# Patient Record
Sex: MALE | Race: BLACK OR AFRICAN AMERICAN | NOT HISPANIC OR LATINO | Employment: STUDENT | URBAN - METROPOLITAN AREA
[De-identification: names, ages, dates, MRNs, and addresses within clinical notes are randomized per-mention and may not be internally consistent; named-entity substitution may affect disease eponyms.]

---
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
## 2024-02-22 ENCOUNTER — OFFICE VISIT (OUTPATIENT)
Dept: URGENT CARE | Facility: CLINIC | Age: 19
End: 2024-02-22
Payer: MEDICAID

## 2024-02-22 VITALS
RESPIRATION RATE: 18 BRPM | OXYGEN SATURATION: 98 % | BODY MASS INDEX: 37.23 KG/M2 | DIASTOLIC BLOOD PRESSURE: 76 MMHG | WEIGHT: 231.69 LBS | HEART RATE: 73 BPM | SYSTOLIC BLOOD PRESSURE: 145 MMHG | HEIGHT: 66 IN | TEMPERATURE: 98 F

## 2024-02-22 DIAGNOSIS — L05.01 PILONIDAL ABSCESS: Primary | ICD-10-CM

## 2024-02-22 PROCEDURE — 99204 OFFICE O/P NEW MOD 45 MIN: CPT | Mod: S$GLB,,, | Performed by: EMERGENCY MEDICINE

## 2024-02-22 RX ORDER — SULFAMETHOXAZOLE AND TRIMETHOPRIM 800; 160 MG/1; MG/1
1 TABLET ORAL 2 TIMES DAILY
Qty: 14 TABLET | Refills: 0 | Status: SHIPPED | OUTPATIENT
Start: 2024-02-22 | End: 2024-02-29

## 2024-02-22 RX ORDER — IBUPROFEN 800 MG/1
800 TABLET ORAL 3 TIMES DAILY
Qty: 21 TABLET | Refills: 0 | Status: SHIPPED | OUTPATIENT
Start: 2024-02-22 | End: 2024-02-29

## 2024-02-22 NOTE — LETTER
February 22, 2024      [facility]  [address] [facility], [address]  [place] [address]  Phone: [phone]  Fax: [phone]       Patient: Tristen Davey   YOB: 2005  Date of Visit: 02/22/2024    To Whom It May Concern:    Lindsay Davey  was at [facility] on 02/22/2024. The patient may return to work/school on 2/24/2024 with no restrictions. If you have any questions or concerns, or if I can be of further assistance, please do not hesitate to contact me.    Sincerely,      Celestine Walsh MD

## 2024-02-22 NOTE — PATIENT INSTRUCTIONS
Referral made to General surgery as this is the 3rd incident in 3 years however incision and drainage may be effective.  If it recurs again please follow up with General surgery     Bactrim double-strength prescription     Ibuprofen 800 mg prescription     Return for any concerns or problems and be sure to return in 48 hours for packing removal and wound check     Work note given to return on Saturday     See abscess incision and drainage sheet

## 2024-02-22 NOTE — PROGRESS NOTES
"Subjective:      Patient ID: Tristen VALENTINO Davey is a 18 y.o. male.    Vitals:  height is 5' 6" (1.676 m) and weight is 105.1 kg (231 lb 11.3 oz). His oral temperature is 98.4 °F (36.9 °C). His blood pressure is 145/76 (abnormal) and his pulse is 73. His respiration is 18 and oxygen saturation is 98%.     Chief Complaint: Abscess    Pt is here for a cyst on his tail bone. Pt mentioned this is the third time it has busted and come back. Pt mentioned he was taking antibiotics for it,pt is no longer on the medicine. Pt say it started to grow back three days ago.      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  It has happened 3 times in the last 3 years and states that it gets better with antibiotics however comes back.  States it occasionally drains on its own.  He states that it has never been officially incised and drained in clinic and he has not followed up with General surgery.  Physical exam shows a large area of induration about 5 x 5 cm with 2 cm area of fluctuance.  I was able to drain significant amount of pus and packed with iodophor.  Will place on antibiotics, ibuprofen 800 and due to recurrence, referral to general surgery for definitive management and prevention of reoccurrence.  It may simply go away since this is the 1st procedural treatment however referral will be in place.  Note to return on Saturday he will return in 48 hours for packing removal and wound check    Abscess  Chronicity:  NewProgression Since Onset: spreading  Abscess location: tailbone.  Associated Symptoms: no fever, no chills  Characteristics: painful    Pain Scale:  8/10  Treatments Tried:  Warm water soaks  Relieved by:  Nothing  Worsened by:  Nothing      Constitution: Negative for chills and fever.   HENT:  Negative for postnasal drip, sinus pain and sore throat.    Neck: Negative for neck pain and neck stiffness.   Cardiovascular:  " Negative for chest pain and palpitations.   Respiratory:  Negative for cough and shortness of breath.    Genitourinary:  Negative for dysuria and hematuria.   Musculoskeletal:  Negative for joint pain.   Skin:  Positive for abscess. Negative for wound, laceration and erythema.   Neurological:  Negative for altered mental status, numbness and tingling.   Psychiatric/Behavioral:  Negative for altered mental status.       Objective:     Physical Exam   Constitutional: He is oriented to person, place, and time. He appears well-developed.   HENT:   Head: Normocephalic and atraumatic. Head is without abrasion, without contusion and without laceration.   Ears:   Right Ear: External ear normal.   Left Ear: External ear normal.   Nose: Nose normal.   Mouth/Throat: Oropharynx is clear and moist and mucous membranes are normal.   Eyes: Conjunctivae, EOM and lids are normal. Pupils are equal, round, and reactive to light.   Neck: Trachea normal and phonation normal. Neck supple.   Cardiovascular: Normal rate, regular rhythm and normal heart sounds.   Pulmonary/Chest: Effort normal and breath sounds normal. No stridor. No respiratory distress.   Musculoskeletal: Normal range of motion.         General: Normal range of motion.   Neurological: He is alert and oriented to person, place, and time.   Skin: Skin is warm, dry, intact and no rash. Capillary refill takes less than 2 seconds. No abrasion, No burn, No bruising, No erythema and No ecchymosis         Comments: Examination of the left upper gluteal cleft and pilonidal region shows a 5 x 5 cm area of induration and 2 x 2 cm area of fluctuance consistent with pilonidal or upper medial gluteal cleft abscess.  Incision and drainage performed see procedure note.  Packed with iodophor.  Covered.  No complications.  It is not perianal and there is no rash or extending cellulitis.   Psychiatric: His speech is normal and behavior is normal. Judgment and thought content normal.  "  Nursing note and vitals reviewed.        Incision & Drainage    Date/Time: 2/22/2024 8:45 AM    Performed by: Celestine Walsh MD  Authorized by: Celestine Walsh MD    Time out: Immediately prior to procedure a "time out" was called to verify the correct patient, procedure, equipment, support staff and site/side marked as required.    Consent Done?:  Yes (Verbal)    Type:  Abscess  Body area:  Anogenital  Location details:  Pilonidal  Local anesthetic: Lidocaine 1% without epinephrine  Anesthetic total (ml):  8  Scalpel size:  11  Incision type:  Single straight  Complexity:  Complex  Drainage:  Pus  Drainage amount:  Moderate  Wound treatment:  Incision, drainage and wound packed  Packing material:  1/4 in iodoform gauze  Patient tolerance:  Patient tolerated the procedure well with no immediate complications    COVERED WITH BACTROBAN OINTMENT  COVERED WITH GAUZE  NO COMPLICATIONS  WILL HAVE PACKING OUT IN 48 HOURS  GIVEN INSTRUCTIONS OF WHEN TO RETURN        Assessment:     1. Pilonidal abscess        Plan:       Pilonidal abscess  -     Incision & Drainage    Other orders  -     sulfamethoxazole-trimethoprim 800-160mg (BACTRIM DS) 800-160 mg Tab; Take 1 tablet by mouth 2 (two) times daily. for 7 days  Dispense: 14 tablet; Refill: 0  -     ibuprofen (ADVIL,MOTRIN) 800 MG tablet; Take 1 tablet (800 mg total) by mouth 3 (three) times daily. for 7 days  Dispense: 21 tablet; Refill: 0      Patient Instructions   Referral made to General surgery as this is the 3rd incident in 3 years however incision and drainage may be effective.  If it recurs again please follow up with General surgery     Bactrim double-strength prescription     Ibuprofen 800 mg prescription     Return for any concerns or problems and be sure to return in 48 hours for packing removal and wound check     Work note given to return on Saturday     See abscess incision and drainage sheet                  "

## 2024-02-24 ENCOUNTER — CLINICAL SUPPORT (OUTPATIENT)
Dept: URGENT CARE | Facility: CLINIC | Age: 19
End: 2024-02-24
Payer: MEDICAID

## 2024-02-24 VITALS
HEART RATE: 73 BPM | WEIGHT: 231 LBS | BODY MASS INDEX: 37.12 KG/M2 | HEIGHT: 66 IN | DIASTOLIC BLOOD PRESSURE: 79 MMHG | OXYGEN SATURATION: 98 % | TEMPERATURE: 98 F | SYSTOLIC BLOOD PRESSURE: 148 MMHG | RESPIRATION RATE: 18 BRPM

## 2024-02-24 DIAGNOSIS — L05.01 PILONIDAL ABSCESS: Primary | ICD-10-CM

## 2024-02-24 PROCEDURE — 99499 UNLISTED E&M SERVICE: CPT | Mod: S$GLB,,,

## 2024-02-24 NOTE — PROGRESS NOTES
"Subjective:      Patient ID: Tristen VALENTINO Davey is a 18 y.o. male.    Vitals:  height is 5' 6" (1.676 m) and weight is 104.8 kg (231 lb). His tympanic temperature is 98 °F (36.7 °C). His blood pressure is 148/79 (abnormal) and his pulse is 73. His respiration is 18 and oxygen saturation is 98%.     Chief Complaint: Dressing Change    Pt is a 17 y/o M here for wound check. Pilonidal abscess drained on 2/22/24 and packing placed. Currently on bactrim. Symptoms improving. Mild discomfort to area. No new symptoms such as fever or chills.    Dressing Change  This is a new problem. The current episode started in the past 7 days. The problem occurs constantly. The problem has been unchanged. Pertinent negatives include no abdominal pain, chills, fever, nausea or vomiting. Treatments tried: prescription meds. The treatment provided moderate relief.       Constitution: Negative for chills and fever.   Gastrointestinal:  Negative for abdominal pain, nausea and vomiting.   Genitourinary:  Negative for testicular pain and pelvic pain.   Skin:  Positive for wound and abscess.      Objective:     Physical Exam   Constitutional: He is oriented to person, place, and time. He appears well-developed.   HENT:   Head: Normocephalic and atraumatic.   Ears:   Right Ear: External ear normal.   Left Ear: External ear normal.   Nose: Nose normal.   Mouth/Throat: Oropharynx is clear and moist.   Eyes: Conjunctivae, EOM and lids are normal.   Neck: Trachea normal and phonation normal. Neck supple.   Musculoskeletal: Normal range of motion.         General: Normal range of motion.   Neurological: He is alert and oriented to person, place, and time.   Skin: Skin is warm, dry and intact.        Psychiatric: His speech is normal and behavior is normal. Judgment and thought content normal.   Nursing note and vitals reviewed.      Assessment:     1. Pilonidal abscess        Plan:       Pilonidal abscess      Packing removed today. Scant purulent " drainage. Area of infection as decreased compared to visit on 2/22/24. Continue bactrim. F/up with gen surg.          Patient Instructions   Continue full course of bactrim.    Follow up with general surgery

## 2024-03-05 ENCOUNTER — OCCUPATIONAL HEALTH (OUTPATIENT)
Dept: URGENT CARE | Facility: CLINIC | Age: 19
End: 2024-03-05

## 2024-03-05 DIAGNOSIS — Z02.89 ENCOUNTER FOR EXAMINATION REQUIRED BY DEPARTMENT OF TRANSPORTATION (DOT): Primary | ICD-10-CM

## 2024-03-05 PROCEDURE — 99499 UNLISTED E&M SERVICE: CPT | Mod: S$GLB,,, | Performed by: EMERGENCY MEDICINE

## 2024-04-15 ENCOUNTER — PATIENT MESSAGE (OUTPATIENT)
Dept: PEDIATRICS | Facility: CLINIC | Age: 19
End: 2024-04-15
Payer: MEDICAID

## 2024-05-21 ENCOUNTER — HOSPITAL ENCOUNTER (EMERGENCY)
Facility: HOSPITAL | Age: 19
Discharge: HOME | End: 2024-05-21
Attending: EMERGENCY MEDICINE

## 2024-05-21 VITALS
SYSTOLIC BLOOD PRESSURE: 158 MMHG | HEART RATE: 79 BPM | HEIGHT: 66 IN | DIASTOLIC BLOOD PRESSURE: 82 MMHG | WEIGHT: 225 LBS | OXYGEN SATURATION: 98 % | TEMPERATURE: 100 F | BODY MASS INDEX: 36.16 KG/M2 | RESPIRATION RATE: 18 BRPM

## 2024-05-21 DIAGNOSIS — U07.1 COVID-19 VIRUS DETECTED: ICD-10-CM

## 2024-05-21 DIAGNOSIS — U07.1 COVID-19: Primary | ICD-10-CM

## 2024-05-21 LAB
CTP QC/QA: YES
INFLUENZA A ANTIGEN, POC: NEGATIVE
INFLUENZA B ANTIGEN, POC: NEGATIVE
POC RAPID STREP A: NEGATIVE
SARS-COV-2 RDRP RESP QL NAA+PROBE: POSITIVE

## 2024-05-21 PROCEDURE — 87880 STREP A ASSAY W/OPTIC: CPT | Mod: ER

## 2024-05-21 PROCEDURE — 99283 EMERGENCY DEPT VISIT LOW MDM: CPT | Mod: ER

## 2024-05-21 PROCEDURE — 25000003 PHARM REV CODE 250: Mod: ER | Performed by: EMERGENCY MEDICINE

## 2024-05-21 PROCEDURE — 87635 SARS-COV-2 COVID-19 AMP PRB: CPT | Mod: ER

## 2024-05-21 PROCEDURE — 87804 INFLUENZA ASSAY W/OPTIC: CPT | Mod: ER

## 2024-05-21 RX ORDER — IBUPROFEN 600 MG/1
600 TABLET ORAL
Status: COMPLETED | OUTPATIENT
Start: 2024-05-21 | End: 2024-05-21

## 2024-05-21 RX ORDER — FLUTICASONE PROPIONATE 50 MCG
1 SPRAY, SUSPENSION (ML) NASAL 2 TIMES DAILY PRN
Qty: 15 G | Refills: 0 | Status: SHIPPED | OUTPATIENT
Start: 2024-05-21

## 2024-05-21 RX ORDER — IBUPROFEN 600 MG/1
600 TABLET ORAL EVERY 6 HOURS PRN
Qty: 20 TABLET | Refills: 0 | Status: SHIPPED | OUTPATIENT
Start: 2024-05-21

## 2024-05-21 RX ORDER — BENZONATATE 100 MG/1
100 CAPSULE ORAL 3 TIMES DAILY PRN
Qty: 20 CAPSULE | Refills: 0 | Status: SHIPPED | OUTPATIENT
Start: 2024-05-21 | End: 2024-05-31

## 2024-05-21 RX ORDER — IBUPROFEN 400 MG/1
400 TABLET ORAL
Status: DISCONTINUED | OUTPATIENT
Start: 2024-05-21 | End: 2024-05-21

## 2024-05-21 RX ORDER — GUAIFENESIN 100 MG/5ML
100-200 SOLUTION ORAL EVERY 4 HOURS PRN
Qty: 60 ML | Refills: 0 | Status: SHIPPED | OUTPATIENT
Start: 2024-05-21 | End: 2024-05-31

## 2024-05-21 RX ORDER — ACETAMINOPHEN 500 MG
500 TABLET ORAL EVERY 6 HOURS PRN
Qty: 20 TABLET | Refills: 0 | Status: SHIPPED | OUTPATIENT
Start: 2024-05-21

## 2024-05-21 RX ORDER — CETIRIZINE HYDROCHLORIDE 10 MG/1
10 TABLET ORAL DAILY
Qty: 30 TABLET | Refills: 0 | Status: SHIPPED | OUTPATIENT
Start: 2024-05-21 | End: 2024-06-20

## 2024-05-21 RX ADMIN — IBUPROFEN 600 MG: 600 TABLET ORAL at 05:05

## 2024-05-21 NOTE — ED PROVIDER NOTES
Encounter Date: 5/21/2024       History     Chief Complaint   Patient presents with    Sore Throat     Sore throat, bodyaches, stomach upset, and chills since Sunday.      Patient is a 18 y.o. male with no past medical history who presents to the Emergency Department for evaluation of URI symptoms x 2 days.  Symptoms include fever, chills, cough, congestion body aches, and sore throat.  He has not taken any medications for the symptoms. He is vaccinated for COVID but not the flu. Denies known sick contacts.  He denies headache, chest pain, shortness of breath, abdominal pain. Denies nausea, vomiting, diarrhea, difficulty swallowing, or otalgia.     The history is provided by the patient.     Review of patient's allergies indicates:  No Known Allergies  No past medical history on file.  No past surgical history on file.  No family history on file.  Social History     Tobacco Use    Smoking status: Never    Smokeless tobacco: Never   Substance Use Topics    Alcohol use: No    Drug use: No     Review of Systems   Constitutional:  Positive for chills. Negative for fever.   HENT:  Positive for congestion and sore throat. Negative for ear pain and trouble swallowing.    Respiratory:  Positive for cough. Negative for shortness of breath.    Cardiovascular:  Negative for chest pain.   Gastrointestinal:  Negative for abdominal pain, nausea and vomiting.   Musculoskeletal:  Positive for myalgias. Negative for neck pain and neck stiffness.   Neurological:  Negative for headaches.       Physical Exam     Initial Vitals [05/21/24 1712]   BP Pulse Resp Temp SpO2   (!) 158/82 79 18 99.7 °F (37.6 °C) 98 %      MAP       --         Physical Exam    Nursing note and vitals reviewed.  Constitutional: He appears well-developed and well-nourished.   HENT:   Head: Normocephalic and atraumatic.   Right Ear: External ear normal.   Left Ear: External ear normal.   There is posterior oropharyngeal erythema with postnasal drip and  cobblestoning in the oropharynx, no tonsillar swelling, no oropharyngeal exudates, uvula is midline. Normal dentition. No trismus.  No muffled voice. No submandibular swelling. Patient is tolerating secretions without difficulty.  Patient is speaking in full sentences on exam without difficulty.  Bilateral tympanic membranes are pearly gray without erythema, bulging, perforation.  There is no postauricular swelling, or overlying erythema or tenderness to palpation over mastoids bilaterally.    Neck: Carotid bruit is not present.   Normal range of motion.  Cardiovascular:  Normal rate, regular rhythm, normal heart sounds and intact distal pulses.     Exam reveals no gallop and no friction rub.       No murmur heard.  Pulmonary/Chest: Breath sounds normal. No respiratory distress. He has no wheezes. He has no rhonchi. He has no rales.   Abdominal: Abdomen is soft. Bowel sounds are normal. He exhibits no distension. There is no abdominal tenderness. There is no rebound and no guarding.   Musculoskeletal:         General: Normal range of motion.      Cervical back: Normal range of motion.     Neurological: He is alert and oriented to person, place, and time. GCS score is 15. GCS eye subscore is 4. GCS verbal subscore is 5. GCS motor subscore is 6.   Psychiatric: He has a normal mood and affect.         ED Course   Procedures  Labs Reviewed   SARS-COV-2 RDRP GENE - Abnormal; Notable for the following components:       Result Value    POC Rapid COVID Positive (*)     All other components within normal limits    Narrative:     This test utilizes isothermal nucleic acid amplification technology to detect the SARS-CoV-2 RdRp nucleic acid segment. The analytical sensitivity (limit of detection) is 500 copies/swab.     A POSITIVE result is indicative of the presence of SARS-CoV-2 RNA; clinical correlation with patient history and other diagnostic information is necessary to determine patient infection status.    A NEGATIVE  result means that SARS-CoV-2 nucleic acids are not present above the limit of detection. A NEGATIVE result should be treated as presumptive. It does not rule out the possibility of COVID-19 and should not be the sole basis for treatment decisions. If COVID-19 is strongly suspected based on clinical and exposure history, re-testing using an alternate molecular assay should be considered.     Commercial kits are provided by [employer].   _________________________________________________________________   The authorized Fact Sheet for Healthcare Providers and the authorized Fact Sheet for Patients of the ID NOW COVID-19 are available on the FDA website:    https://www.fda.gov/media/858197/download      https://www.fda.gov/media/990839/download      POCT STREP A MOLECULAR   POCT INFLUENZA A/B MOLECULAR   POCT STREP A, RAPID   POCT RAPID INFLUENZA A/B          Imaging Results    None          Medications   ibuprofen tablet 600 mg (600 mg Oral Given 5/21/24 1722)     Medical Decision Making  This is an emergent evaluation of a  18 y.o. male with no past medical history who presents to the Emergency Department for evaluation of URI symptoms x 2 days.      Patient looks well clinically. There is posterior oropharyngeal erythema with postnasal drip and cobblestoning in the oropharynx, no tonsillar swelling, no oropharyngeal exudates, uvula is midline. Normal dentition. No trismus.  No muffled voice. No submandibular swelling. Patient is tolerating secretions without difficulty.  Patient is speaking in full sentences on exam without difficulty.  Bilateral tympanic membranes are pearly gray without erythema, bulging, perforation.  There is no postauricular swelling, or overlying erythema or tenderness to palpation over mastoids bilaterally.  Regular rate rhythm without murmurs.  No carotid bruits appreciated on exam. Lungs are clear to auscultation bilaterally.  Abdomen is soft, nontender, non distended, with normal  bowel sounds.     Differential diagnosis includes but is not limited to COVID, flu, strep, other viral syndrome.    Workup initiated with viral swabs. Ordered motrin.  Vital signs, chart, labs, and/or imaging were all reviewed.  See ED course below and interpretations above. My overall impression is COVID. Will discharge home with symptomatic medications as listed below.  CDC precautions discussed. Patient is very well appearing, and in no acute distress. Vital signs are reassuring here in the emergency department, patient is afebrile, breathing comfortable, satting 98 % on room air. Patient/Caregiver is stable for discharge at this time.  Patient/Caregiver was informed of results and plan of care. Patient/Caregiver verbalized understanding of care plan. All questions and concerns were addressed. Discussed strict return precautions with the patient/caregiver. Instructed follow up with primary care provider within 1 week.      Romaine Obrien PA-C    DISCLAIMER: This note was prepared with [employer] voice recognition transcription software. Garbled syntax, mangled pronouns, and other bizarre constructions may be attributed to that software system.      Amount and/or Complexity of Data Reviewed  Labs: ordered. Decision-making details documented in ED Course.    Risk  OTC drugs.  Prescription drug management.               ED Course as of 05/21/24 1745   Tue May 21, 2024   1742 POCT COVID-19 Rapid Screening(!)  COVID positive. [TM]   1742 POCT Rapid Strep A  Strep negative. [TM]   1743 POCT Rapid Influenza A/B  Flu negative. [TM]   1743 Informed patient of results.  Will discharge home with symptomatic care.  Work note provided. [TM]      ED Course User Index  [TM] Romaine Obrien PA-C                           Clinical Impression:  Final diagnoses:  [U07.1] COVID-19 (Primary)          ED Disposition Condition    Discharge Stable          ED Prescriptions       Medication Sig Dispense Start Date End Date Auth. Provider     benzonatate (TESSALON) 100 MG capsule Take 1 capsule (100 mg total) by mouth 3 (three) times daily as needed. 20 capsule 5/21/2024 5/31/2024 Romaine Obrien PA-C    guaiFENesin 100 mg/5 ml (ROBITUSSIN) 100 mg/5 mL syrup Take 5-10 mLs (100-200 mg total) by mouth every 4 (four) hours as needed for Cough. 60 mL 5/21/2024 5/31/2024 Romaine Obrien PA-C    acetaminophen (TYLENOL) 500 MG tablet Take 1 tablet (500 mg total) by mouth every 6 (six) hours as needed. 20 tablet 5/21/2024 -- Romaine Obrien PA-C    ibuprofen (ADVIL,MOTRIN) 600 MG tablet Take 1 tablet (600 mg total) by mouth every 6 (six) hours as needed for Pain. 20 tablet 5/21/2024 -- Romaine Obrien PA-C    cetirizine (ZYRTEC) 10 MG tablet Take 1 tablet (10 mg total) by mouth once daily. 30 tablet 5/21/2024 6/20/2024 Romaine Obrien PA-C    fluticasone propionate (FLONASE) 50 mcg/actuation nasal spray 1 spray (50 mcg total) by Each Nostril route 2 (two) times daily as needed for Rhinitis. 15 g 5/21/2024 -- Romaine Obrien PA-C    benzocaine-menthoL 15-3.6 mg Lozg Follow directions on packaging 18 lozenge 5/21/2024 -- Romaine Obrien PA-C          Follow-up Information       Follow up With Specialties Details Why Contact Info    [facility] ED Emergency Medicine Go to  As needed, If symptoms worsen, or new symptoms develop [id number] [facility] [id number] [phone]    Primary care doctor  Schedule an appointment as soon as possible for a visit in 3 days               Romaine Obrien PA-C  05/21/24 [id number]

## 2024-05-21 NOTE — DISCHARGE INSTRUCTIONS
You were seen in the emergency department today for viral symptoms and were diagnosed with COVID-19.  Take all medications as prescribed for symptoms.  Per CDC guidelines, once you were fever free and feeling improved you may return to normal activity.  No isolation required if feeling better.  It is important to remember that some problems are difficult to diagnose and may not be found during your Emergency Department visit. Be sure to follow up with your primary care doctor and review all labs/imaging/tests that were performed during this visit with them. Some labs/tests may be outside of the normal range and require non-emergent follow-up and further investigation to help diagnose/exclude/prevent complications or other medical conditions. Return to the emergency department for any new or worsening symptoms. Thank you for allowing me to care for you today, it was my pleasure. I hope you get to feeling better soon!

## 2024-05-21 NOTE — Clinical Note
"Tristen "Tristen" Petar was seen and treated in our emergency department on 5/21/2024.  He may return to work on 05/24/2024.       If you have any questions or concerns, please don't hesitate to call.      Romaine Obrien PA-C"